# Patient Record
Sex: MALE
[De-identification: names, ages, dates, MRNs, and addresses within clinical notes are randomized per-mention and may not be internally consistent; named-entity substitution may affect disease eponyms.]

---

## 2023-05-25 ENCOUNTER — APPOINTMENT (OUTPATIENT)
Dept: ORTHOPEDIC SURGERY | Facility: CLINIC | Age: 38
End: 2023-05-25
Payer: MEDICAID

## 2023-05-25 VITALS — BODY MASS INDEX: 22.9 KG/M2 | WEIGHT: 160 LBS | HEIGHT: 70 IN

## 2023-05-25 DIAGNOSIS — S43.101A UNSPECIFIED DISLOCATION OF RIGHT ACROMIOCLAVICULAR JOINT, INITIAL ENCOUNTER: ICD-10-CM

## 2023-05-25 PROBLEM — Z00.00 ENCOUNTER FOR PREVENTIVE HEALTH EXAMINATION: Status: ACTIVE | Noted: 2023-05-25

## 2023-05-25 PROCEDURE — 99203 OFFICE O/P NEW LOW 30 MIN: CPT

## 2023-05-25 NOTE — IMAGING
[de-identified] : Examination of the right shoulder, patient has significant prominence over the AC joint.\par Patient has tender ovation over the AC joint.\par Degrees some swelling and that abrasion over the top of the shoulder.\par Patient has decreased range of motion, patient is able to forward flex to about 80 degrees with significant end of range pain, patient is able to abduct to about 70 degrees.\par Patient unable to internally or externally rotated due to pain.\par Patient has decreased motion possibly due to pain and swelling.\par \par X-ray of the right shoulder was done in [facility], this x-ray was negative for any acute fracture there is a widening over the AC joint suggesting a grade 2 2 to grade 3 separation of the acromioclavicular joint

## 2023-05-25 NOTE — DISCUSSION/SUMMARY
[de-identified] : Impression: Right shoulder AC separation possible grade 2 or 3\par \par Plan: Patient was advised for the use of a sling for comfort.\par Patient was advised to prevent any over the head activities.\par Patient was advised for an MRI of the right shoulder to evaluate the AC joint separation.\par \par Follow-up: Patient was advised to follow-up in about 2 weeks for reevaluation with our shoulder specialist.\par \par
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
## 2023-05-31 ENCOUNTER — APPOINTMENT (OUTPATIENT)
Dept: MRI IMAGING | Facility: CLINIC | Age: 38
End: 2023-05-31

## 2023-06-01 ENCOUNTER — APPOINTMENT (OUTPATIENT)
Dept: ORTHOPEDIC SURGERY | Facility: CLINIC | Age: 38
End: 2023-06-01